# Patient Record
Sex: FEMALE | Race: WHITE | NOT HISPANIC OR LATINO | Employment: OTHER | URBAN - METROPOLITAN AREA
[De-identification: names, ages, dates, MRNs, and addresses within clinical notes are randomized per-mention and may not be internally consistent; named-entity substitution may affect disease eponyms.]

---

## 2017-01-05 NOTE — PATIENT DISCUSSION
1.  Glaucoma suspect OU - No signs of glaucomatous damage to the optic nerve based on todays examination and testing. Will continue to monitor. 2. Transplant Doing well after transplant. Continue topical steroids. Rejection and vaccinations reviewed. rejection resolved continue 2x/d OD 1x/d OS 3. PVD OU:  Patient was cautioned to call our office immediately if they experience a substantial change in their symptoms such as an increase in floaters persistent flashes loss of visual field (may appear as a shadow or a curtain) or decrease in visual acuity as these may indicate a retinal tear or detachment. If this is a new problem patient will need to return for re-examination  as determined by the [employer] [facility] 10. Pseudophakia OU - IOLs stable. Monitor. 5. PCO  OD (Posterior Capsule Opacification)  Not visually significant at this time. Monitor for yag capsulotomy necessity. Return for an appointment in 4 months for pressure check. with Dr. Teresa Ritter.

## 2017-03-09 NOTE — PATIENT DISCUSSION
1.  Glaucoma suspect OU - No signs of glaucomatous damage to the optic nerve based on todays examination and testing. Will continue to monitor. 2. Transplant Doing well after transplant. Continue topical steroids. Rejection and vaccinations reviewed. Decrease Lootemax 1x/d OD 1x/d OS Restasis 1x/d3. PVD OU:  Patient was cautioned to call our office immediately if they experience a substantial change in their symptoms such as an increase in floaters persistent flashes loss of visual field (may appear as a shadow or a curtain) or decrease in visual acuity as these may indicate a retinal tear or detachment. If this is a new problem patient will need to return for re-examination  as determined by the [facility] 10. Pseudophakia OU - IOLs stable. Monitor. 5. PCO  OD (Posterior Capsule Opacification)  Not visually significant at this time. Monitor for yag capsulotomy necessity. Return for an appointment in 4 months for pressure check. with Dr. Camilla Crigler.

## 2017-04-25 NOTE — PATIENT DISCUSSION
1. PVD OS:      No changes. No retinal involvement. Patient was cautioned to call our office immediately if they experience a substantial change in their symptoms such as an increase in floaters persistent flashes loss of visual field (may appear as a shadow or a curtain) or decrease in visual acuity as these may indicate a retinal tear or detachment. 2.  2..Dry eyes ou-  tears more often.   3.  RTN 7/17 CE  DR Hahn

## 2017-07-13 NOTE — PATIENT DISCUSSION
1.  PVD OU:  Patient was cautioned to call our office immediately if they experience a substantial change in their symptoms such as an increase in floaters persistent flashes loss of visual field (may appear as a shadow or a curtain) or decrease in visual acuity as these may indicate a retinal tear or detachment. If this is a new problem patient will need to return for re-examination  as determined by the [address]. Pseudophakia OU - IOLs stable. Monitor. 3. Transplant Doing well after transplant. Continue topical steroids. Rejection and vaccinations reviewed. 4. Glaucoma suspect OU - No signs of glaucomatous damage to the optic nerve based on todays examination and testing. Will continue to monitor. 5. Return for an appointment in 6 months for pressure check. Fundus Photo Disc. with Dr. Elma Toth.

## 2017-11-22 NOTE — PATIENT DISCUSSION
1.  New PVD OS:      new large Patient was cautioned to call our office immediately if they experience a substantial change in their symptoms such as an increase in floaters persistent flashes loss of visual field (may appear as a shadow or a curtain) or decrease in visual acuity as these may indicate a retinal tear or detachment. RTN 3-4 weeks DFTA OS. Patient returned call from [place], she will be home all mording today

## 2018-01-11 NOTE — PATIENT DISCUSSION
1.  Glaucoma suspect OU - IOP normal on lotemax disc stable. No signs of glaucomatous damage to the optic nerve based on todays examination and testing. Will continue to monitor. 2. Dry Eye OU:  Continue current management with Restasis. 3.  Pseudophakia OU - IOLs stable. Monitor. 4. Transplant Doing well after transplant. Continue topical steroids. Rejection and vaccinations reviewed. 5. PVD OU:  Patient was cautioned to call our office immediately if they experience a substantial change in their symptoms such as an increase in floaters persistent flashes loss of visual field (may appear as a shadow or a curtain) or decrease in visual acuity as these may indicate a retinal tear or detachment. If this is a new problem patient will need to return for re-examination  as determined by the [facility]. Return for an appointment in 6 months for comprehensive exam. with Dr. Jasiel Colindres.

## 2018-07-12 NOTE — PATIENT DISCUSSION
1.  Glaucoma suspect OU - IOP normal on lotemax disc stable. OCT NOrmal RNFL 94/85. Good GCC. LVF 1/17. NOrmal.No signs of glaucomatous damage to the optic nerve based on todays examination and testing. Will continue to monitor. 2. Dry Eye OU:  Continue current management with Restasis. Using 1 xper day3. Pseudophakia OU - IOLs stable. open capsule margarita. Monitor. 4. DSEK OU--Transplant Doing well. Continue topical steroids. Rejection and vaccinations reviewed. 5. PVD OU:  Patient was cautioned to call our office immediately if they experience a substantial change in their symptoms such as an increase in floaters persistent flashes loss of visual field (may appear as a shadow or a curtain) or decrease in visual acuity as these may indicate a retinal tear or detachment. 6.  NO rx changes. 7.   RTN 6 mths tack 1 yr CE

## 2019-01-04 NOTE — PATIENT DISCUSSION
1.  Glaucoma suspect OU - IOP normal on lotemax disc stable. OCT NOrmal RNFL 94/85. Good GCC. LVF 1/17. NOrmal. No signs of glaucomatous damage to the optic nerve based on todays examination and testing. Will continue to monitor. 2. Dry Eye OU:  Continue current management with Restasis. Using 1-2x per day3. Pseudophakia OU - IOLs stable. open capsule margarita. Monitor. 4. DSEK OU--Transplant Doing well. Continue Lotemax qd ou. Rejection and vaccinations reviewed. 5. PVD OU:  Patient was cautioned to call our office immediately if they experience a substantial change in their symptoms such as an increase in floaters persistent flashes loss of visual field 6. NO rx changes. 7.   RTN 7/19 CE/OCT

## 2019-07-08 NOTE — PATIENT DISCUSSION
1.  Glaucoma suspect OU - IOP normal on lotemax for DSEK. disc stable. OCT 7/8/19  NOrmal RNFL 94/85. Good GCC. LVF 1/17. NOrmal.No signs of glaucomatous damage to the optic nerve based on todays examination and testing. Will continue to monitor. 2. Dry Eye OU:  Continue current management with Restasis. Using 1 x per day3. Pseudophakia OU - IOLs stable. open capsule margarita. Monitor. 4. DSEK OU--Transplant Doing well. Continue Lotemax qd ou. Rejection and vaccinations reviewed. 5. PVD OU:  Patient was cautioned to call our office immediately if they experience a substantial change in their symptoms such as an increase in floaters persistent flashes loss of visual field (may appear as a shadow or a curtain) or decrease in visual acuity as these may indicate a retinal tear or detachment. 6.  NO rx changes. Disc getting his own readers for better near viisons with astigmatism.  7.  RTN 6 mths tack 1 yr CE/VF/optos

## 2019-12-11 ENCOUNTER — NEW PATIENT COMPREHENSIVE (OUTPATIENT)
Dept: URBAN - METROPOLITAN AREA CLINIC 32 | Facility: CLINIC | Age: 69
End: 2019-12-11

## 2019-12-11 DIAGNOSIS — H35.351: ICD-10-CM

## 2019-12-11 PROCEDURE — 92004 COMPRE OPH EXAM NEW PT 1/>: CPT

## 2019-12-11 PROCEDURE — 92134 CPTRZ OPH DX IMG PST SGM RTA: CPT

## 2019-12-11 ASSESSMENT — KERATOMETRY
OS_K1POWER_DIOPTERS: 44.25
OD_AXISANGLE2_DEGREES: 125
OD_AXISANGLE_DEGREES: 35
OS_K2POWER_DIOPTERS: 43.5
OD_K2POWER_DIOPTERS: 43.25
OS_AXISANGLE_DEGREES: 97
OS_AXISANGLE2_DEGREES: 7
OD_K1POWER_DIOPTERS: 43.5

## 2019-12-11 ASSESSMENT — VISUAL ACUITY
OD_SC: 20/200
OD_PH: 20/80
OS_SC: 20/100
OS_CC: J1-1
OS_CC: 20/40
OD_CC: 20/100
OD_CC: J4

## 2019-12-11 ASSESSMENT — TONOMETRY
OD_IOP_MMHG: 17
OS_IOP_MMHG: 16

## 2019-12-30 ENCOUNTER — FOLLOW UP (OUTPATIENT)
Dept: URBAN - METROPOLITAN AREA CLINIC 32 | Facility: CLINIC | Age: 69
End: 2019-12-30

## 2019-12-30 DIAGNOSIS — H35.351: ICD-10-CM

## 2019-12-30 PROCEDURE — 92134 CPTRZ OPH DX IMG PST SGM RTA: CPT

## 2019-12-30 PROCEDURE — 92014 COMPRE OPH EXAM EST PT 1/>: CPT

## 2019-12-30 ASSESSMENT — KERATOMETRY
OD_AXISANGLE_DEGREES: 35
OS_K1POWER_DIOPTERS: 44.25
OS_AXISANGLE2_DEGREES: 7
OD_AXISANGLE2_DEGREES: 125
OS_K2POWER_DIOPTERS: 43.5
OD_K1POWER_DIOPTERS: 43.5
OS_AXISANGLE_DEGREES: 97
OD_K2POWER_DIOPTERS: 43.25

## 2019-12-30 ASSESSMENT — TONOMETRY
OS_IOP_MMHG: 16
OD_IOP_MMHG: 19

## 2019-12-30 ASSESSMENT — VISUAL ACUITY
OS_CC: 20/30-2
OD_CC: 20/100+1

## 2020-01-08 NOTE — PATIENT DISCUSSION
1.  Glaucoma suspect OU - IOP normal on lotemax for DSEK. disc stable. OCT 7/8/19  NOrmal RNFL 94/85. Good GCC. LVF 1/17. NOrmal.No signs of glaucomatous damage to the optic nerve based on todays examination and testing. Will continue to monitor. 2. Dry Eye OU:  Continue current management with Restasis. Using 1 x per day Increase tears 2-4x per day3. Pseudophakia OU - IOLs stable. open capsule margarita. Monitor. 4. DSEK OU--Transplant Doing well. Continue Lotemax qd ou. Rejection and vaccinations reviewed. 5. PVD OU:  Patient was cautioned to call our office immediately if they experience a substantial change in their symptoms such as an increase in floaters persistent flashes loss of visual field (may appear as a shadow or a curtain) or decrease in visual acuity as these may indicate a retinal tear or detachment. 6.  NO rx changes. Disc getting his own readers for better near viisons with astigmatism.  7.  RTN  7/20 CE/VF/optos

## 2020-07-08 NOTE — PATIENT DISCUSSION
1.  Glaucoma suspect OU - IOP normal on lotemax for DSEK. disc stable. OCT 7/8/19  NOrmal RNFL 94/85. Good GCC. LVF /8/20  NOrmal. No signs of glaucomatous damage to the optic nerve based on todays examination and testing. Will continue to monitor. 2. Dry Eye OU:  Continue current management with Restasis. Using 1 x per day Increase tears 2-4x per day3. Pseudophakia OU - IOLs stable. open capsule margarita. Monitor. 4. DSEK OU--Transplant Doing well. Continue Lotemax qd ou. Rejection and vaccinations reviewed. 5. PVD OU:  Patient was cautioned to call our office immediately if they experience a substantial change in their symptoms such as an increase in floaters persistent flashes loss of visual field (may appear as a shadow or a curtain) or decrease in visual acuity as these may indicate a retinal tear or detachment. 6.  NO rx changes. Disc getting his own readers for better near viisons with astigmatism. 7.  Pt may get flu shot in Fall 2020 and was given card to start Pred.   8.  RTN  7/21 CE/OCT nerve/optos

## 2020-11-04 NOTE — PATIENT DISCUSSION
1.  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 Glaucoma suspect OU - IOP normal on lotemax for DSEK. disc stable. OCT 7/8/19  NOrmal RNFL 94/85. Good GCC. LVF /8/20  NOrmal. No signs of glaucomatous damage to the optic nerve based on todays examination and testing. Will continue to monitor. 3. Dry Eye OU:  Continue current management with Restasis. Using 1 x per day Increase tears 2-4x per day4. Pseudophakia OU - IOLs stable. open capsule margarita. Monitor. 5. DSEK OU--Transplant Doing well. Continue Lotemax qd ou. Rejection and vaccinations reviewed. 6. PVD OU:  Patient was cautioned to call our office immediately if they experience a substantial change in their symptoms such as an increase in floaters persistent flashes loss of visual field (may appear as a shadow or a curtain) or decrease in visual acuity as these may indicate a retinal tear or detachment.   7. Refer to Retina to eval OD for RT/RD

## 2020-11-05 NOTE — PATIENT DISCUSSION
Recommend MONITORING for new TEAR/BREAK/DETACHMENT. 11/5/20: GIVEN PRESENCE OF TRACE FLUID DONT RECOMMEND PNEUMATIC RETINOPEXY AT THIS TIME.

## 2020-11-05 NOTE — PATIENT DISCUSSION
No retinal tears or retinal detachment seen on clinical exam today IN OS. MAC ON RD IN OD AS ABOVE. Reviewed the signs and symptoms of retinal tear/retinal detachment and the importance of calling for prompt evaluation should there be increasing floaters, new flashing lights, or decreasing peripheral vision in either eye at any time. Observation recommended.

## 2020-11-05 NOTE — PATIENT DISCUSSION
11/5/20: GIVEN PRESENCE OF CONTRALATERAL LESION (TUFT W SMALL TRACE SRF, DONT RECOMMEND DC). WE'LL GIVE DIAMOX TO REDUCE RISK OF PROGRESSION AND Recommended VITRECTOMY, endolaser, FLUID/GAS EXCHANGE. Discussed benefits, alternatives, and risks of surgery including (but not limited to) cataract (if patient has natural lens), glaucoma, infection, bleeding, redetachment, optic neuropathy, loss of vision, blindness, and loss of eye. Patient understands more than one operation may be needed to repair retinal detachment.  Will see dr Josiane Huang on monday am for sx in pm.

## 2020-11-09 NOTE — PATIENT DISCUSSION
ARTIFICIAL TEARS to affected eye(s) as needed. Render Note In Bullet Format When Appropriate: No Detail Level: Simple Post-Care Instructions: I reviewed with the patient in detail post-care instructions. Patient is to wear sunprotection, and avoid picking at any of the treated lesions. Pt may apply Vaseline to crusted or scabbing areas. Duration Of Freeze Thaw-Cycle (Seconds): 0 Consent: The patient's consent was obtained including but not limited to risks of crusting, scabbing, blistering, scarring, darker or lighter pigmentary change, recurrence, incomplete removal and infection. Medical Necessity Information: It is in your best interest to select a reason for this procedure from the list below. All of these items fulfill various CMS LCD requirements except the new and changing color options. Medical Necessity Clause: This procedure was medically necessary because the lesions that were treated were:

## 2021-04-12 NOTE — PATIENT DISCUSSION
NO PROGRESSION on Imaging. Pt states she had back surgery that her  had knowledge of but she did not.    LOC: The patient is awake, alert, and oriented to place, time, situation. Affect is appropriate.  Speech is appropriate and clear.     APPEARANCE: Patient resting comfortably in no acute distress.  Patient is incontinent of urine, wearing a diaper.    SKIN: The skin is warm and dry; color consistent with ethnicity.  Patient has poor skin turgor and dry mucus membranes.  Skin is fragile., bruising noted, IV infiltration to left AC.   Lower midback with scabbing covered with tape.    MUSCULOSKELETAL: Patient moving upper and lower extremities without difficulty.  Appears  Weak, fragile and deconditioned..     RESPIRATORY: Airway is open and patent. Respirations spontaneous, even, easy, and non-labored.  Patient has a normal effort and rate.  No accessory muscle use noted. Denies cough.     CARDIAC:  BP is elevated 224/98.   No peripheral edema noted. No complaints of chest pain.      ABDOMEN: Soft and non tender to palpation.  No distention noted.     NEUROLOGIC: Eyes open spontaneously.  Behavior appropriate to situation.  Follows commands; facial expression symmetrical.  Purposeful motor response noted; normal sensation in all extremities.

## 2021-07-07 NOTE — PATIENT DISCUSSION
1.  Ret RD and Hole OD----following with Retina   Johnson-- 2. Glaucoma suspect OU - IOP normal on lotemax for DSEK. disc stable. OCT 7/7/21  NOrmal RNFL 91/81 from 94/85. Good GCC. LVF /8/20  NOrmal. No signs of glaucomatous damage to the optic nerve based on todays examination and testing. Will continue to monitor. 3. Dry Eye OU:  Continue current management with Restasis. Using 1 x per day Increase tears 2-4x per day4. Pseudophakia OU - IOLs stable. open capsule margarita. Monitor. 5. DSEK OU--Transplant Doing well. Continue Lotemax qd ou. Rejection and vaccinations reviewed. 6. PVD OU:  Patient was cautioned to call our office immediately if they experience a substantial change in their symptoms such as an increase in floaters persistent flashes loss of visual field (may appear as a shadow or a curtain) or decrease in visual acuity as these may indicate a retinal tear or detachment.   7. RTN 1/22 DF/VF/OPTOS and eval OD retina

## 2021-11-22 NOTE — PATIENT DISCUSSION
1.  Ret RD and Hole OD----following with Retina yearly-  Johnson-- 2. Glaucoma suspect OU - IOP normal on lotemax for DSEK. disc stable. OCT 7/7/21  NOrmal RNFL 91/81 from 94/85. Good GCC. LVF 11/22/21  NOrmal. No signs of glaucomatous damage to the optic nerve based on todays examination and testing. Will continue to monitor. 3. Dry Eye OU:  Continue current management with Restasis. Using 1 x per day Increase tears 2-4x per day4. Pseudophakia OU - IOLs stable. open capsules ou. Monitor. 5. DSEK OU--Transplant Doing well. Continue Lotemax qd ou. Rejection and vaccinations reviewed. 6. PVD OU:  Patient was cautioned to call our office immediately if they experience a substantial change in their symptoms such as an increase in floaters persistent flashes loss of visual field (may appear as a shadow or a curtain) or decrease in visual acuity as these may indicate a retinal tear or detachment. 7. RTN 6 mo CE/OCT On  and eval OD retina Pt moving to [facility]--Pt signed Records release.  11/22/21 sergio

## 2023-12-07 NOTE — PATIENT DISCUSSION
No retinal tears or retinal detachment seen on clinical exam today. Reviewed the signs and symptoms of retinal tear/retinal detachment and the importance of calling for prompt evaluation should there be increasing floaters, new flashing lights, or decreasing peripheral vision in either eye at any time. Observation recommended. abdominal region